# Patient Record
Sex: FEMALE | Race: WHITE | NOT HISPANIC OR LATINO
[De-identification: names, ages, dates, MRNs, and addresses within clinical notes are randomized per-mention and may not be internally consistent; named-entity substitution may affect disease eponyms.]

---

## 2024-01-31 PROBLEM — Z00.00 ENCOUNTER FOR PREVENTIVE HEALTH EXAMINATION: Status: ACTIVE | Noted: 2024-01-31

## 2024-02-05 ENCOUNTER — APPOINTMENT (OUTPATIENT)
Dept: UROLOGY | Facility: CLINIC | Age: 54
End: 2024-02-05
Payer: COMMERCIAL

## 2024-02-05 VITALS
HEIGHT: 67 IN | OXYGEN SATURATION: 98 % | HEART RATE: 86 BPM | SYSTOLIC BLOOD PRESSURE: 125 MMHG | TEMPERATURE: 98.42 F | BODY MASS INDEX: 18.36 KG/M2 | DIASTOLIC BLOOD PRESSURE: 80 MMHG | WEIGHT: 117 LBS

## 2024-02-05 DIAGNOSIS — R10.2 PELVIC AND PERINEAL PAIN: ICD-10-CM

## 2024-02-05 DIAGNOSIS — Z78.9 OTHER SPECIFIED HEALTH STATUS: ICD-10-CM

## 2024-02-05 DIAGNOSIS — R31.0 GROSS HEMATURIA: ICD-10-CM

## 2024-02-05 LAB
BILIRUB UR QL STRIP: NORMAL
CLARITY UR: CLEAR
COLLECTION METHOD: NORMAL
GLUCOSE UR-MCNC: NORMAL
HCG UR QL: 0.2 EU/DL
HGB UR QL STRIP.AUTO: NORMAL
KETONES UR-MCNC: NORMAL
LEUKOCYTE ESTERASE UR QL STRIP: NORMAL
NITRITE UR QL STRIP: NORMAL
PH UR STRIP: 7.5
PROT UR STRIP-MCNC: NORMAL
SP GR UR STRIP: 1.01

## 2024-02-05 PROCEDURE — 99203 OFFICE O/P NEW LOW 30 MIN: CPT | Mod: 25

## 2024-02-05 PROCEDURE — 81003 URINALYSIS AUTO W/O SCOPE: CPT | Mod: QW

## 2024-02-05 RX ORDER — ESTRADIOL 10 UG/1
10 INSERT VAGINAL
Refills: 0 | Status: ACTIVE | COMMUNITY

## 2024-02-06 LAB
ANION GAP SERPL CALC-SCNC: 11 MMOL/L
BUN SERPL-MCNC: 15 MG/DL
CALCIUM SERPL-MCNC: 9.5 MG/DL
CHLORIDE SERPL-SCNC: 102 MMOL/L
CO2 SERPL-SCNC: 28 MMOL/L
CREAT SERPL-MCNC: 0.75 MG/DL
EGFR: 94 ML/MIN/1.73M2
GLUCOSE SERPL-MCNC: 94 MG/DL
POTASSIUM SERPL-SCNC: 4.2 MMOL/L
SODIUM SERPL-SCNC: 140 MMOL/L

## 2024-03-10 ENCOUNTER — NON-APPOINTMENT (OUTPATIENT)
Age: 54
End: 2024-03-10

## 2024-03-11 NOTE — HISTORY OF PRESENT ILLNESS
[FreeTextEntry1] : Language: English Date of First visit: 2024 Accompanied by: Self Contact info: *** Referring Provider/PCP: Dr. Kenia Gagnon  fax: [phone]    CC/ Problem List:   =============================================================================== FIRST VISIT: The patient is a 53 year female who first presents 2024 for sensation of a UTI.  In 2023 she started having episodic burning all the time. It is NOT just with urinating. She states the burning is around her suprapubic area. She states when it is there, it is "all the time". Sometimes it is extremely strong 8-9/10. She sometimes feels her ovaries are hurting. She tried doxycycline which helped. She had a vaginitis panel that was negative. She denies genital rashes. She states once she wiped and she saw blood when her symptoms were very bad.  She denies LBP. She is always constipated. She is on Vagifem for dyspareunia which has really helped.   She has no problems urinating at baseline. She denies incontinence and straining. She denies sensation of prolapse. She does not drink coffee, soda, she drinks decaf tea; no spicy foods   ------------------------------------------------------------------------------------------- INTERVAL VISITS:     ===============================================================================   PMH: Denies PSH: Denies POBH: (if applicable) , LMP age 48, both  FH:   ALL: NKDA MEDS: MVI, Zinc, D-mannose, Vagifem SOC: Denies Tob, EtOH, drugs     ROS: Review of Systems is as per HPI unless otherwise denoted below     =============================================================================== DATA:   LABS:-------------------------------------------------------------------------------------------------------------------  2024: UA dip small LE, neg NIT, neg blood, neg glucose     RADS:-------------------------------------------------------------------------------------------------------------------  10/18/2023: TV TA sono normal ovaries, normal uerus with 4mm anterior uterine body fibroid 7mm left ovarian cyst     PATHOLOGY/CYTOLOGY:-------------------------------------------------------------------------------------------       VOIDING STUDIES: ----------------------------------------------------------------------------------------------------  2024: PVR 0cc     STONE STUDIES: (Analysis/LLSA)----------------------------------------------------------------------------------       PROCEDURES: -----------------------------------------------------------------------------------------------         ===============================================================================   PHYSICAL EXAM:  GEN: AAOx3, NAD, Habitus: normal  BARRIERS to CARE: none  PSYCH: Appropriate Behavior, Affect Congruent  HEENT: AT/NC Trachea midline.   Lungs: No labored breathing.  NEURO: + Movement, all 4 extremities grossly intact without deficits. No tremors.  SKIN: Warm dry. No visible rashes or ulcers  GAIT: Gait normal, Stability good  =======================================================================================       ASSESSMENT and PLAN     The patient is a 53 year female with a history of the followin. Suprapubic burning and an episode of gross hematuria without UTI We will have her do a BMP, CTU, and cystoscopy with pelvic exam. If this is normal I will probably send her to Dr. Jensen or [facility]. I have given her Dr Palma's name since she has a long wait.  -----------------------------------------------------------------------------------------------------  LABS/TESTS Ordered: BMP, CTU  Meds Ordered:  Follow up: cysto and pelvic  -----------------------------------------------------------------------------------------------------  The total amount of time I have personally spent preparing for this visit, reviewing the patient's test results, obtaining external history, ordering tests/medications, documenting clinical information, communicating with and counseling the patient/family and/or caregiver(s), and spent face to face with the patient explaining the above was 40 minutes.  Thank you for allowing me to assist in the care of your patient. Should you have any questions please do not hesitate to reach out to me.      Thank you for allowing me to participate in your patient's care. Please feel free to contact me with any questions.   Radha Boss MD [facility] Department of Urology  [address] [place] [id number] P: [phone]; F: [phone]

## 2024-03-11 NOTE — ADDENDUM
[FreeTextEntry1] : 2/5/2024: BMP normal with sCre 0.75   2/24/2024: CTU Kidneys normal. Segmental opacification of right ureter, not well opacified. No hydro or stones. No filling defects. Bladder normal. Uterine phleboliths.  Liver normal with large hemangiomas. GB normal. Pancreas, spleen, bowel, aorta normal IVC normal

## 2024-04-01 ENCOUNTER — APPOINTMENT (OUTPATIENT)
Dept: UROLOGY | Facility: CLINIC | Age: 54
End: 2024-04-01

## 2024-04-08 ENCOUNTER — APPOINTMENT (OUTPATIENT)
Dept: UROLOGY | Facility: CLINIC | Age: 54
End: 2024-04-08
Payer: COMMERCIAL

## 2024-04-08 VITALS
HEART RATE: 88 BPM | DIASTOLIC BLOOD PRESSURE: 75 MMHG | SYSTOLIC BLOOD PRESSURE: 118 MMHG | OXYGEN SATURATION: 99 % | TEMPERATURE: 98.42 F

## 2024-04-08 LAB
BILIRUB UR QL STRIP: NORMAL
CLARITY UR: CLEAR
COLLECTION METHOD: NORMAL
GLUCOSE UR-MCNC: NORMAL
HCG UR QL: 0.2 EU/DL
HGB UR QL STRIP.AUTO: NORMAL
KETONES UR-MCNC: NORMAL
LEUKOCYTE ESTERASE UR QL STRIP: NORMAL
NITRITE UR QL STRIP: NORMAL
PH UR STRIP: 6.5
PROT UR STRIP-MCNC: NORMAL
SP GR UR STRIP: 1.01

## 2024-04-08 PROCEDURE — 81003 URINALYSIS AUTO W/O SCOPE: CPT | Mod: QW

## 2024-04-08 PROCEDURE — 52000 CYSTOURETHROSCOPY: CPT

## 2024-04-08 PROCEDURE — 99213 OFFICE O/P EST LOW 20 MIN: CPT | Mod: 25

## 2024-04-09 ENCOUNTER — NON-APPOINTMENT (OUTPATIENT)
Age: 54
End: 2024-04-09

## 2024-04-24 ENCOUNTER — TRANSCRIPTION ENCOUNTER (OUTPATIENT)
Age: 54
End: 2024-04-24

## 2024-04-25 ENCOUNTER — APPOINTMENT (OUTPATIENT)
Dept: OPHTHALMOLOGY | Facility: CLINIC | Age: 54
End: 2024-04-25
Payer: COMMERCIAL

## 2024-04-25 ENCOUNTER — NON-APPOINTMENT (OUTPATIENT)
Age: 54
End: 2024-04-25

## 2024-04-25 PROCEDURE — 92133 CPTRZD OPH DX IMG PST SGM ON: CPT

## 2024-04-25 PROCEDURE — 92020 GONIOSCOPY: CPT

## 2024-04-25 PROCEDURE — 92083 EXTENDED VISUAL FIELD XM: CPT

## 2024-04-25 PROCEDURE — 92014 COMPRE OPH EXAM EST PT 1/>: CPT
